# Patient Record
Sex: FEMALE | Race: WHITE
[De-identification: names, ages, dates, MRNs, and addresses within clinical notes are randomized per-mention and may not be internally consistent; named-entity substitution may affect disease eponyms.]

---

## 2019-01-06 ENCOUNTER — HOSPITAL ENCOUNTER (EMERGENCY)
Dept: HOSPITAL 54 - ER | Age: 24
Discharge: HOME | End: 2019-01-06
Payer: MEDICAID

## 2019-01-06 VITALS — DIASTOLIC BLOOD PRESSURE: 61 MMHG | SYSTOLIC BLOOD PRESSURE: 117 MMHG

## 2019-01-06 VITALS — BODY MASS INDEX: 20.83 KG/M2 | WEIGHT: 125 LBS | HEIGHT: 65 IN

## 2019-01-06 DIAGNOSIS — B34.9: Primary | ICD-10-CM

## 2019-01-06 LAB
PH UR STRIP: 7 [PH] (ref 5–8)
UROBILINOGEN UR STRIP-MCNC: 0.2 EU/DL

## 2019-08-06 ENCOUNTER — HOSPITAL ENCOUNTER (EMERGENCY)
Dept: HOSPITAL 54 - ER | Age: 24
Discharge: HOME | End: 2019-08-06
Payer: MEDICAID

## 2019-08-06 VITALS — SYSTOLIC BLOOD PRESSURE: 110 MMHG | DIASTOLIC BLOOD PRESSURE: 62 MMHG

## 2019-08-06 VITALS — HEIGHT: 68 IN | WEIGHT: 106 LBS | BODY MASS INDEX: 16.06 KG/M2

## 2019-08-06 DIAGNOSIS — N39.0: Primary | ICD-10-CM

## 2019-08-06 LAB
BILIRUB UR QL STRIP: (no result)
PH UR STRIP: 5.5 [PH] (ref 5–8)
PROT UR QL STRIP: 100 MG/DL
UROBILINOGEN UR STRIP-MCNC: 0.2 EU/DL
WBC #/AREA URNS HPF: (no result) /HPF
WBC #/AREA URNS HPF: (no result) /HPF (ref 0–3)

## 2019-12-08 ENCOUNTER — HOSPITAL ENCOUNTER (EMERGENCY)
Dept: HOSPITAL 54 - ER | Age: 24
Discharge: HOME | End: 2019-12-08
Payer: COMMERCIAL

## 2019-12-08 VITALS — BODY MASS INDEX: 17.66 KG/M2 | WEIGHT: 106 LBS | HEIGHT: 65 IN

## 2019-12-08 VITALS — SYSTOLIC BLOOD PRESSURE: 125 MMHG | DIASTOLIC BLOOD PRESSURE: 78 MMHG

## 2019-12-08 DIAGNOSIS — K12.1: Primary | ICD-10-CM

## 2020-01-27 ENCOUNTER — OFFICE (OUTPATIENT)
Dept: URBAN - METROPOLITAN AREA CLINIC 45 | Facility: CLINIC | Age: 25
End: 2020-01-27

## 2020-01-27 VITALS
WEIGHT: 109 LBS | HEART RATE: 69 BPM | HEIGHT: 65 IN | SYSTOLIC BLOOD PRESSURE: 106 MMHG | DIASTOLIC BLOOD PRESSURE: 71 MMHG

## 2020-01-27 DIAGNOSIS — K64.4 RESIDUAL HEMORRHOIDAL SKIN TAGS: ICD-10-CM

## 2020-01-27 DIAGNOSIS — K59.09 CONSTIPATION, OTHER: ICD-10-CM

## 2020-01-27 PROCEDURE — 99203 OFFICE O/P NEW LOW 30 MIN: CPT | Performed by: INTERNAL MEDICINE

## 2020-09-24 ENCOUNTER — OFFICE (OUTPATIENT)
Dept: URBAN - METROPOLITAN AREA CLINIC 45 | Facility: CLINIC | Age: 25
End: 2020-09-24

## 2020-09-24 VITALS
HEART RATE: 87 BPM | WEIGHT: 106 LBS | HEIGHT: 65 IN | SYSTOLIC BLOOD PRESSURE: 105 MMHG | DIASTOLIC BLOOD PRESSURE: 83 MMHG | TEMPERATURE: 97.5 F

## 2020-09-24 DIAGNOSIS — K64.4: ICD-10-CM

## 2020-09-24 PROCEDURE — 99213 OFFICE O/P EST LOW 20 MIN: CPT | Performed by: INTERNAL MEDICINE

## 2020-09-24 NOTE — SERVICEHPINOTES
The patient complains of sensation of soft tissue lump in the anorectal area. She denies current change in bowel habits, diarrhea or constipation, although recalls constipation experienced years ago during her pregnancy. Currently she experiences intermittent burning, perianal discomfort with occasional soft tissue prolapse. The patient has no family history of colon cancer or other significant GI diseases. Patient denies nausea, vomiting, dysphagia, reflux, abdominal pain, change in bowel habits, rectal bleeding, melena, and significant change in weight. Denies shortness of breath and chest pain.

## 2021-11-14 ENCOUNTER — HOSPITAL ENCOUNTER (EMERGENCY)
Dept: HOSPITAL 54 - ER | Age: 26
Discharge: HOME | End: 2021-11-14
Payer: COMMERCIAL

## 2021-11-14 VITALS — HEIGHT: 67 IN | WEIGHT: 107 LBS | BODY MASS INDEX: 16.79 KG/M2

## 2021-11-14 VITALS — SYSTOLIC BLOOD PRESSURE: 113 MMHG | DIASTOLIC BLOOD PRESSURE: 82 MMHG

## 2021-11-14 DIAGNOSIS — J32.9: Primary | ICD-10-CM

## 2022-05-14 ENCOUNTER — HOSPITAL ENCOUNTER (EMERGENCY)
Dept: HOSPITAL 54 - ER | Age: 27
Discharge: HOME | End: 2022-05-14
Payer: MEDICAID

## 2022-05-14 VITALS — HEIGHT: 65 IN | BODY MASS INDEX: 19.99 KG/M2 | WEIGHT: 120 LBS

## 2022-05-14 VITALS — DIASTOLIC BLOOD PRESSURE: 77 MMHG | SYSTOLIC BLOOD PRESSURE: 115 MMHG

## 2022-05-14 DIAGNOSIS — U07.1: Primary | ICD-10-CM

## 2022-05-14 DIAGNOSIS — Z79.1: ICD-10-CM

## 2022-05-14 DIAGNOSIS — R07.81: ICD-10-CM

## 2022-05-14 NOTE — NUR
TO ER BED 6. BIBS C/O CHEST PAIN AND PALPITATIONS. TESTED POSITIVE FOR COVID ON 
MONDAY. TOOK TYLENOL WITH LITTLE RELIEF. CONNECTED TO MONITOR. COVID 
PRECAUTIONS IN PLACE. AWAITING MD RAMIREZ

## 2022-10-06 ENCOUNTER — OFFICE (OUTPATIENT)
Dept: URBAN - METROPOLITAN AREA CLINIC 45 | Facility: CLINIC | Age: 27
End: 2022-10-06

## 2022-10-06 VITALS
HEIGHT: 65 IN | HEART RATE: 82 BPM | WEIGHT: 101 LBS | TEMPERATURE: 97.6 F | SYSTOLIC BLOOD PRESSURE: 91 MMHG | DIASTOLIC BLOOD PRESSURE: 58 MMHG

## 2022-10-06 DIAGNOSIS — R10.13 EPIGASTRIC PAIN: ICD-10-CM

## 2022-10-06 DIAGNOSIS — K64.4: ICD-10-CM

## 2022-10-06 DIAGNOSIS — A04.8: ICD-10-CM

## 2022-10-06 PROCEDURE — 99214 OFFICE O/P EST MOD 30 MIN: CPT | Performed by: INTERNAL MEDICINE

## 2022-10-06 RX ORDER — OMEPRAZOLE MAGNESIUM, AMOXICILLIN AND RIFABUTIN 10; 250; 12.5 MG/1; MG/1; MG/1
120 CAPSULE, DELAYED RELEASE ORAL
Qty: 168 | Status: COMPLETED
Start: 2022-10-06 | End: 2023-11-16

## 2022-10-06 NOTE — SERVICEHPINOTES
The patient presents for evaluation of H. pylori infection and intermittent abdominal pain. She was not able to tolerate previously prescribed H. pylori or education regimen.  She is also concerned about sensation of prolapsed external hemorrhoid, yet denies change in bowel habits, diarrhea or constipation. Phe patient was previously seen in September 2020 for perianal discomfort and external hemorrhoids. The patient has no family history of colon cancer or other significant GI diseases.

## 2023-06-30 ENCOUNTER — OFFICE (OUTPATIENT)
Dept: URBAN - METROPOLITAN AREA CLINIC 45 | Facility: CLINIC | Age: 28
End: 2023-06-30

## 2023-06-30 VITALS — HEIGHT: 65 IN | WEIGHT: 110 LBS

## 2023-06-30 DIAGNOSIS — K59.09 CONSTIPATION, OTHER: ICD-10-CM

## 2023-06-30 DIAGNOSIS — R10.13: ICD-10-CM

## 2023-06-30 DIAGNOSIS — A04.8: ICD-10-CM

## 2023-06-30 DIAGNOSIS — K64.4: ICD-10-CM

## 2023-06-30 PROCEDURE — G0406 INPT/TELE FOLLOW UP 15: HCPCS | Performed by: INTERNAL MEDICINE

## 2023-06-30 PROCEDURE — 99214 OFFICE O/P EST MOD 30 MIN: CPT | Performed by: INTERNAL MEDICINE

## 2023-06-30 RX ORDER — OMEPRAZOLE MAGNESIUM, AMOXICILLIN AND RIFABUTIN 10; 250; 12.5 MG/1; MG/1; MG/1
CAPSULE, DELAYED RELEASE ORAL
Qty: 168 | Status: ACTIVE
Start: 2023-06-30

## 2023-11-16 ENCOUNTER — OFFICE (OUTPATIENT)
Dept: URBAN - METROPOLITAN AREA CLINIC 45 | Facility: CLINIC | Age: 28
End: 2023-11-16

## 2023-11-16 VITALS
HEIGHT: 65 IN | HEART RATE: 88 BPM | DIASTOLIC BLOOD PRESSURE: 59 MMHG | SYSTOLIC BLOOD PRESSURE: 98 MMHG | WEIGHT: 108 LBS

## 2023-11-16 DIAGNOSIS — R10.33: ICD-10-CM

## 2023-11-16 DIAGNOSIS — Z34.01: ICD-10-CM

## 2023-11-16 DIAGNOSIS — K64.4: ICD-10-CM

## 2023-11-16 PROCEDURE — 99214 OFFICE O/P EST MOD 30 MIN: CPT | Performed by: INTERNAL MEDICINE

## 2024-06-14 ENCOUNTER — OFFICE (OUTPATIENT)
Dept: URBAN - METROPOLITAN AREA CLINIC 45 | Facility: CLINIC | Age: 29
End: 2024-06-14

## 2024-06-14 VITALS — HEIGHT: 65 IN | WEIGHT: 112 LBS

## 2024-06-14 DIAGNOSIS — R19.4: ICD-10-CM

## 2024-06-14 DIAGNOSIS — A04.8: ICD-10-CM

## 2024-06-14 DIAGNOSIS — R10.13: ICD-10-CM

## 2024-06-14 PROCEDURE — G2211 COMPLEX E/M VISIT ADD ON: HCPCS | Performed by: INTERNAL MEDICINE

## 2024-06-14 PROCEDURE — 99214 OFFICE O/P EST MOD 30 MIN: CPT | Mod: 95 | Performed by: INTERNAL MEDICINE

## 2024-06-14 NOTE — SERVICENOTES
30 minutes were spent in dedicated E/M time during the date of service, including preparation of the medical chart, review of previous information, data analysis/discussion, and/or discussion with the patient

## 2024-06-14 NOTE — SERVICEHPINOTES
The patient is scheduled today for  telehealth visit.The clinician is connected to a secure network. The patient consents to this teleconference being a billable service. This visit used [employer] for a live, interactive audio and video telehealth visit.   The patient returns for follow-up of frequent epigastric and periumbilical pain and as well as alteration of bowel movements.  She has been lately noticing mucus around her stools. She has alternation of diarrhea and intermittent constipation with bowel cramping and urgency which suggests IBS. She has a history of H. pylori infection documented by stool tests which failed eradication by several conventional antibiotics. I prescribed Talicia in 2022, but she did not take it. The patient was previously seen for perianal discomfort and external hemorrhoids.

## 2024-07-15 ENCOUNTER — AMBULATORY SURGICAL CENTER (OUTPATIENT)
Dept: URBAN - METROPOLITAN AREA SURGERY 28 | Facility: SURGERY | Age: 29
End: 2024-07-15

## 2024-07-15 VITALS
TEMPERATURE: 97.9 F | RESPIRATION RATE: 16 BRPM | DIASTOLIC BLOOD PRESSURE: 78 MMHG | HEIGHT: 65 IN | WEIGHT: 112 LBS | HEART RATE: 99 BPM | SYSTOLIC BLOOD PRESSURE: 115 MMHG | OXYGEN SATURATION: 99 %

## 2024-07-15 DIAGNOSIS — A04.8 OTHER SPECIFIED BACTERIAL INTESTINAL INFECTIONS: ICD-10-CM

## 2024-07-15 DIAGNOSIS — R10.13 EPIGASTRIC PAIN: ICD-10-CM

## 2024-07-15 PROCEDURE — 43235 EGD DIAGNOSTIC BRUSH WASH: CPT | Performed by: INTERNAL MEDICINE

## 2024-07-15 NOTE — SERVICEHPINOTES
The patient returns for EGD evaluation epigastric and periumbilical pain.  SShe has alternation of diarrhea and intermittent constipation with bowel cramping and urgency which suggests IBS. She has a history of H. pylori infection documented by stool tests which failed eradication by several conventional antibiotics. I prescribed Talicia in 2022, but she did not take it. The patient was previously seen for perianal discomfort and external hemorrhoids.

## 2024-07-18 LAB
GI HISTOLOGY: A: (no result)
GI HISTOLOGY: B: (no result)

## 2024-07-23 ENCOUNTER — OFFICE (OUTPATIENT)
Dept: URBAN - METROPOLITAN AREA CLINIC 45 | Facility: CLINIC | Age: 29
End: 2024-07-23

## 2024-07-23 VITALS — WEIGHT: 112 LBS | HEIGHT: 65 IN

## 2024-07-23 DIAGNOSIS — A04.8: ICD-10-CM

## 2024-07-23 DIAGNOSIS — R10.13: ICD-10-CM

## 2024-07-23 DIAGNOSIS — R19.4: ICD-10-CM

## 2024-07-23 PROCEDURE — G2211 COMPLEX E/M VISIT ADD ON: HCPCS | Performed by: INTERNAL MEDICINE

## 2024-07-23 PROCEDURE — 99213 OFFICE O/P EST LOW 20 MIN: CPT | Mod: 95 | Performed by: INTERNAL MEDICINE

## 2024-07-23 RX ORDER — OMEPRAZOLE MAGNESIUM, AMOXICILLIN AND RIFABUTIN 10; 250; 12.5 MG/1; MG/1; MG/1
CAPSULE, DELAYED RELEASE ORAL
Qty: 168 | Status: ACTIVE
Start: 2023-06-30

## 2024-07-23 NOTE — SERVICENOTES
24 minutes were spent in dedicated E/M time during the date of service, including preparation of the medical chart, review of previous information, data analysis/discussion, and/or discussion with the patient

## 2024-07-23 NOTE — SERVICEHPINOTES
The patient is scheduled today for  telehealth visit.The clinician is connected to a secure network. The patient consents to this teleconference being a billable service. This visit used [employer] for a live, interactive audio and video telehealth visit.   The patient returns after recent EGD evaluation epigastric and periumbilical pain. EGD demonstrated H. pylori associated gastritis, otherwise no abnormalities. H. pylori infection was previous documented by stool tests but failed eradication by several conventional antibiotics. I subsequently prescribed Talicia in 2022, but she did not take it. The patient was previously seen for perianal discomfort and external hemorrhoids.